# Patient Record
Sex: FEMALE | Race: WHITE | NOT HISPANIC OR LATINO | Employment: FULL TIME | URBAN - METROPOLITAN AREA
[De-identification: names, ages, dates, MRNs, and addresses within clinical notes are randomized per-mention and may not be internally consistent; named-entity substitution may affect disease eponyms.]

---

## 2023-08-22 ENCOUNTER — OCCUPATIONAL MEDICINE (OUTPATIENT)
Dept: URGENT CARE | Facility: PHYSICIAN GROUP | Age: 28
End: 2023-08-22
Payer: OTHER MISCELLANEOUS

## 2023-08-22 VITALS
OXYGEN SATURATION: 98 % | RESPIRATION RATE: 18 BRPM | BODY MASS INDEX: 27.78 KG/M2 | TEMPERATURE: 97.6 F | HEART RATE: 65 BPM | DIASTOLIC BLOOD PRESSURE: 64 MMHG | SYSTOLIC BLOOD PRESSURE: 116 MMHG | WEIGHT: 177 LBS | HEIGHT: 67 IN

## 2023-08-22 DIAGNOSIS — S39.012A LUMBAR STRAIN, INITIAL ENCOUNTER: ICD-10-CM
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
## 2023-08-22 PROCEDURE — 3078F DIAST BP <80 MM HG: CPT | Performed by: PHYSICIAN ASSISTANT

## 2023-08-22 PROCEDURE — 3074F SYST BP LT 130 MM HG: CPT | Performed by: PHYSICIAN ASSISTANT

## 2023-08-22 PROCEDURE — 99204 OFFICE O/P NEW MOD 45 MIN: CPT | Performed by: PHYSICIAN ASSISTANT

## 2023-08-22 RX ORDER — NORETHINDRONE ACETATE AND ETHINYL ESTRADIOL, ETHINYL ESTRADIOL AND FERROUS FUMARATE 1MG-10(24)
1 KIT ORAL DAILY
COMMUNITY
Start: 2023-08-03

## 2023-08-22 RX ORDER — VITAMIN B COMPLEX
2000 TABLET ORAL DAILY
COMMUNITY

## 2023-08-22 RX ORDER — SERTRALINE HYDROCHLORIDE 100 MG/1
TABLET, FILM COATED ORAL
COMMUNITY
Start: 2023-08-10

## 2023-08-22 NOTE — LETTER
"EMPLOYEE’S CLAIM FOR COMPENSATION/ REPORT OF INITIAL TREATMENT  FORM C-4  PLEASE TYPE OR PRINT    EMPLOYEE’S CLAIM - PROVIDE ALL INFORMATION REQUESTED   First Name  Meseret Last Name  Ericka Birthdate                    1995                Sex  female Claim Number (Insurer’s Use Only)   Home Address  [address] Age  27 y.o. Height  1.702 m (5' 7\") Weight  80.3 kg (177 lb) [facility]     [facility] Zip  [address] Telephone  [phone] (home)    Mailing Address  [address]  [address] Primary Language Spoken  English    INSURER   THIRD-PARTY     [facility] Insurance   Employee's Occupation (Job Title) When Injury or Occupational Disease Occurred  [employer]     Employer's Name/Company Name     Telephone      Office Mail Address (Number and Street)          Date of Injury  8/21/2023               Hours Injury  5:15 AM Date Employer Notified  8/21/2023 Last Day of Work after Injury or Occupational Disease  8/22/2023 Supervisor to Whom Injury     Reported  Kayla Monge/ Betty Cisneros   Address or Location of Accident (if applicable)  Work [1]   What were you doing at the time of accident? (if applicable)  Walking to the kitchen door.    How did this injury or occupational disease occur? (Be specific and answer in detail. Use additional sheet if necessary)  I was walking to the kitchen door, while our core angeline  was apparently moving toward the door at the same time. He was moving very fast and quickly opened the door with a loaded hand cart. The door hit me with such force, that I was pushed backward, hitting my back on the floor.   If you believe that you have an occupational disease, when did you first have knowledge of the disability and its relationship to your employment?   Witnesses to the Accident (if applicable)  Teddy Hernandez Lorri " Maximo Stark      Nature of Injury or Occupational Disease  Workers' Compensation  Part(s) of Body Injured or Affected  Lower Back Area (Lumbar Area & Lumbo-Sacral), N/A, N/A    I CERTIFY THAT THE ABOVE IS TRUE AND CORRECT TO T HE BEST OF MY KNOWLEDGE AND THAT I HAVE PROVIDED THIS INFORMATION IN ORDER TO OBTAIN THE BENEFITS OF [place], OR [place]).  I HEREBY AUTHORIZE ANY PHYSICIAN, CHIROPRACTOR, SURGEON, PRACTITIONER OR ANY OTHER PERSON, ANY HOSPITAL, INCLUDING [facility] OR [facility], ANY  MEDICAL SERVICE ORGANIZATION, ANY INSURANCE COMPANY, OR OTHER INSTITUTION OR ORGANIZATION TO RELEASE TO EACH OTHER, ANY MEDICAL OR OTHER INFORMATION, INCLUDING BENEFITS PAID OR PAYABLE, PERTINENT TO THIS INJURY OR DISEASE, EXCEPT INFORMATION RELATIVE TO DIAGNOSIS, TREATMENT AND/OR COUNSELING FOR AIDS, PSYCHOLOGICAL CONDITIONS, ALCOHOL OR CONTROLLED SUBSTANCES, FOR WHICH I MUST GIVE SPECIFIC AUTHORIZATION.  A PHOTOSTAT OF THIS AUTHORIZATION SHALL BE VALID AS THE ORIGINAL.       Date   Place Employee’s Original or  *Electronic Signature   THIS REPORT MUST BE COMPLETED AND MAILED WITHIN 3 WORKING DAYS OF TREATMENT   Place  [facility]  Name of Facility  [facility]   Date  8/22/2023 Diagnosis and Description of Injury or Occupational Disease  (S39.012A) Lumbar strain, initial encounter Is there evidence that the injured employee was under the influence of alcohol and/or another controlled substance at the time of accident?  ? No ? Yes (if yes, please explain)   Hour  4:03 PM   The encounter diagnosis was Lumbar strain, initial encounter. No   Treatment  May continue OTC NSAIDs.  Recommend ice for the next 48 hours and thereafter transition to damp heat.  Patient declines a muscle relaxant.  We will see her back next Monday for reevaluation.  Have you advised the patient to remain off work five days or      "more?    X-Ray Findings      ? Yes Indicate dates:   From   To      From information given by the employee, together with medical evidence, can        you directly connect this injury or occupational disease as job incurred?  Yes ? No If no, is the injured employee capable of:  ? full duty  No ? modified duty  Yes   Is additional medical care by a physician indicated?  Yes If modified duty, specify any limitations / restrictions  10# weight restriction. Seated/sedentary work only   Do you know of any previous injury or disease contributing to this condition or occupational disease?  ? Yes ? No (Explain if yes)                          No   Date  8/22/2023 Print Health Care Provider's  Name  James Peace P.A.-C. I certify that the employer’s copy of  this form was delivered to the employer on:   Address  [address] Insurer’s Use Only     [facility]  [address]    Provider’s Tax ID Number  [id number] Telephone  Dept: [phone]             Health Care Provider’s Original or Electronic Signature  e-JAMES Alvarez P.A.-C. Degree (MD,DO, DC,PAMaycoC,APRN)  PA-C      * Complete and attach Release of Information (Form C-4A) when injured employee signs C-4 Form electronically  ORIGINAL - TREATING HEALTHCARE PROVIDER PAGE 2 - INSURER/TPA PAGE 3 - EMPLOYER PAGE 4 - EMPLOYEE             Form C-4 (rev.08/21)           BRIEF DESCRIPTION OF RIGHTS AND BENEFITS  (Pursuant to [place] 616[place].050)    Notice of Injury or Occupational Disease (Incident Report Form C-1): If an injury or occupational disease (OD) arises out of and in the course of employment, you must provide written notice to your employer as soon as practicable, but no later than 7 days after the accident or OD. Your employer shall maintain a sufficient supply of the required forms.    Claim for Compensation (Form C-4): If medical treatment is sought, the form C-4 is available at the place of initial treatment. A completed \"Claim for Compensation\" (Form C-4) " must be filed within 90 days after an accident or OD. The treating physician or chiropractor must, within 3 working days after treatment, complete and mail to the employer, the employer's insurer and third-party , the Claim for Compensation.    Medical Treatment: If you require medical treatment for your on-the-job injury or OD, you may be required to select a physician or chiropractor from a list provided by your workers’ compensation insurer, if it has contracted with an Organization for Managed Care (MCO) or Preferred Provider Organization (PPO) or providers of health care. If your employer has not entered into a contract with an MCO or PPO, you may select a physician or chiropractor from the Panel of Physicians and Chiropractors. Any medical costs related to your industrial injury or OD will be paid by your insurer.    Temporary Total Disability (TTD): If your doctor has certified that you are unable to work for a period of at least 5 consecutive days, or 5 cumulative days in a 20-day period, or places restrictions on you that your employer does not accommodate, you may be entitled to TTD compensation.    Temporary Partial Disability (TPD): If the wage you receive upon reemployment is less than the compensation for TTD to which you are entitled, the insurer may be required to pay you TPD compensation to make up the difference. TPD can only be paid for a maximum of 24 months.    Permanent Partial Disability (PPD): When your medical condition is stable and there is an indication of a PPD as a result of your injury or OD, within 30 days, your insurer must arrange for an evaluation by a rating physician or chiropractor to determine the degree of your PPD. The amount of your PPD award depends on the date of injury, the results of the PPD evaluation, your age and wage.    Permanent Total Disability (PTD): If you are medically certified by a treating physician or chiropractor as permanently and totally  disabled and have been granted a PTD status by your insurer, you are entitled to receive monthly benefits not to exceed 66 2/3% of your average monthly wage. The amount of your PTD payments is subject to reduction if you previously received a lump-sum PPD award.    Vocational Rehabilitation Services: You may be eligible for vocational rehabilitation services if you are unable to return to the job due to a permanent physical impairment or permanent restrictions as a result of your injury or occupational disease.    Transportation and Per Murphy Reimbursement: You may be eligible for travel expenses and per murphy associated with medical treatment.    Reopening: You may be able to reopen your claim if your condition worsens after claim closure.     Appeal Process: If you disagree with a written determination issued by the insurer or the insurer does not respond to your request, you may appeal to the Department of Administration, , by following the instructions contained in your determination letter. You must appeal the determination within 70 days from the date of the determination letter at [address], [address], [place] [address], or [address], [address], [place] [address]. If you disagree with the  decision, you may appeal to the Department of Administration, . You must file your appeal within 30 days from the date of the  decision letter at [address], [address], [place], [place] [address], or [address], [address], [place] [address]. If you disagree with a decision of an , you may file a petition for judicial review with the District Court. You must do so within 30 days of the Appeal Officer’s decision. You may be represented by an  at your own expense or you may contact the [facility] for possible representation.    [place]  for Injured Workers (IW): If you disagree  with a  decision, you may request that [facility] represent you without charge at an  Hearing. For information regarding denial of benefits, you may contact the [facility] at: [address], [address], [place] [address], [phone], or [address], [address], [place] [address], [phone]    To File a Complaint with the Division: If you wish to file a complaint with the  of the Division of Industrial Relations (DIR),  please contact the Workers’ Compensation Section, [address], [address], [place], [place] [address], telephone [phone]) [phone], or [address], [address], [place], [place] [address], telephone [phone].    For assistance with Workers’ Compensation Issues: You may contact the [facility] Office for Consumer Health Assistance, [address], [address], [place], [place] [address], Toll Free 1-496.847.5914, Web site: http://[facility].[place].gov/Programs/VAIBHAV E-mail: [email or web address]              __________________________________________________________________                                    _________________            Employee Name / Signature                                                                                                                            Date                                                                                                                                                                                                                              D-2 (rev. 10/20)

## 2023-08-22 NOTE — PROGRESS NOTES
"Subjective:   Meseret Barnett is a 27 y.o. female who presents for Back Pain (Workers comp new back pain. Kitchen door opened and hit her she fell to floor. )           Date of Injury:  8/21/2023  Mechanism of Injury:  \"I was walking to the kitchen door, while our core angeline  was apparently moving toward the door at the same time. He was moving very fast and quickly opened the door with a loaded hand cart. The door hit me with such force, that I was pushed backward, hitting my back on the floor.\"    Pain is located in the left low back and left buttock.  Pain does not radiate.  No lower extremity numbness or tingling, no saddle dysesthesias, no loss of bowel or bladder control, no lower extremity weakness.  Ice, ibuprofen helped a bit.      ROS    PMH: Past medical history reviewed in Epic  MEDS: Medications were reviewed in Epic  ALLERGIES: Allergies were reviewed in Epic     Objective:   /64 (BP Location: Right arm, Patient Position: Sitting, BP Cuff Size: Adult)   Pulse 65   Temp 36.4 °C (97.6 °F) (Temporal)   Resp 18   Ht 1.702 m (5' 7\")   Wt 80.3 kg (177 lb)   SpO2 98%   BMI 27.72 kg/m²   Physical Exam  Vitals reviewed.   Constitutional:       General: She is not in acute distress.     Appearance: Normal appearance. She is well-developed. She is not toxic-appearing.   HENT:      Head: Normocephalic and atraumatic.      Right Ear: External ear normal.      Left Ear: External ear normal.      Nose: Nose normal.   Cardiovascular:      Rate and Rhythm: Normal rate and regular rhythm.   Pulmonary:      Effort: Pulmonary effort is normal. No respiratory distress.      Breath sounds: No stridor.   Musculoskeletal:      Comments: Back:  General: No asymmetry, bruising, or erythema appreciated  ROM: restricted  Palpation: No tenderness to palpation of spinous processes, no step-offs appreciated. Sacrum NTTP. TTP left paraspinals, left quadratus laborum and left glute med.   Strength: 5/5 " resisted hip, knee, ankle flexion/extension  Special tests: Straight leg raise -   Neuro: Sensation intact and equal bilaterally in LE's     Skin:     General: Skin is dry.   Neurological:      Comments: Alert and oriented.    Psychiatric:         Speech: Speech normal.         Behavior: Behavior normal.          Assessment/Plan:   1. Lumbar strain, initial encounter    2. Fall, initial encounter    3. Work related injury    Other orders  - sertraline (ZOLOFT) 100 MG Tab; TAKE 1/2 TABLET BY MOUTH ONCE DAILY THEN 1 ONCE DAILY FOR DEPRESSION AND FOR ANXIETY IF REGRESSING PREFERABLE AT BEDTIME  - LO LOESTRIN FE 1 MG-10 MCG / 10 MCG Tab; Take 1 Tablet by mouth every day.  - vitamin D3 (CHOLECALCIFEROL) 1000 Unit (25 mcg) Tab; Take 2,000 Units by mouth every day.    No bony tenderness on exam. Discussed obtaining imaging, but clinical suspicion for fx low at this time. Through shared decision making we decided to forgo imaging today. Will reconsider if sx are not improving.     May continue OTC NSAIDs.  Recommend ice for the next 48 hours and thereafter transition to damp heat.  Patient declines a muscle relaxant.  We will see her back next Monday for reevaluation.

## 2023-08-28 ENCOUNTER — OCCUPATIONAL MEDICINE (OUTPATIENT)
Dept: URGENT CARE | Facility: PHYSICIAN GROUP | Age: 28
End: 2023-08-28
Payer: OTHER MISCELLANEOUS

## 2023-08-28 VITALS
SYSTOLIC BLOOD PRESSURE: 122 MMHG | RESPIRATION RATE: 14 BRPM | HEART RATE: 68 BPM | HEIGHT: 67 IN | WEIGHT: 177 LBS | TEMPERATURE: 97.1 F | OXYGEN SATURATION: 100 % | DIASTOLIC BLOOD PRESSURE: 66 MMHG | BODY MASS INDEX: 27.78 KG/M2

## 2023-08-28 DIAGNOSIS — S39.012D LOW BACK STRAIN, SUBSEQUENT ENCOUNTER: ICD-10-CM
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
## 2023-08-28 PROCEDURE — 3074F SYST BP LT 130 MM HG: CPT

## 2023-08-28 PROCEDURE — 99213 OFFICE O/P EST LOW 20 MIN: CPT

## 2023-08-28 PROCEDURE — 3078F DIAST BP <80 MM HG: CPT

## 2023-08-28 NOTE — PROGRESS NOTES
"Subjective:     Meseret Barnett is a 27 y.o. female who presents for Other (WC F/U: Lower back injury, is improving. )      DOI:  8/21/2023  \"I was walking to the kitchen door, while our core angeline  was apparently moving toward the door at the same time. He was moving very fast and quickly opened the door with a loaded hand cart. The door hit me with such force, that I was pushed backward, hitting my back on the floor.\"    Pain is located in the left low back and left buttock.  Pain does not radiate.  No lower extremity numbness or tingling, no saddle dysesthesias, no loss of bowel or bladder control, no lower extremity weakness.  Ice, ibuprofen helped a bit. May continue OTC NSAIDs.  Recommend ice for the next 48 hours and thereafter transition to damp heat.  Patient declines a muscle relaxant    8/28 2nd visit  reports she is generally improving, she still has mild pain when she goes from sit to standing or if she is bending over trying  to pick something off floor, otherwise pain free.  She has been stretching, using ibuprofen and going on walks. Feels like she may be able to increase her weight restrictions.     PMH:   No pertinent past medical history to this problem  MEDS:  Medications were reviewed in EMR  ALLERGIES:  Allergies were reviewed in EMR  FH:   No pertinent family history to this problem       Objective:     /66   Pulse 68   Temp 36.2 °C (97.1 °F)   Resp 14   Ht 1.702 m (5' 7\")   Wt 80.3 kg (177 lb)   SpO2 100%   BMI 27.72 kg/m²     Gen: no acute distress, normal voice  Skin: dry, intact, moist mucosal membranes  Head: Atraumatic, normocephalic  Psych: normal affect, normal judgement, alert, awake  Musculoskeletal: No TTp to paraspinous muscles, 5/5 strength        Assessment/Plan:       There are no diagnoses linked to this encounter.  Released to Restricted Duty FROM 8/28/2023 TO 9/4/2023  No pushing or pulling, limit bending as tolerated.  Weight limit of no more " than 20 lbs.  Continue heat therapy and ibuprofen.  OK to swim for exercise.  Will see back in UC in 1 week for potential DC       Differential diagnosis, natural history, supportive care, and indications for immediate follow-up discussed.

## 2023-08-28 NOTE — LETTER
"   [facility]  [address]  [place] [address]  Phone:  [phone] - Fax:  [phone]   [facility] Progress Report and Disability Certification  Date of Service: 8/28/2023   No Show:  No  Date / Time of Next Visit: 9/4/2023   Claim Information   Patient Name: Meseret Barnett  Claim Number:     Employer:    Date of Injury: 8/21/2023     Insurer / TPA: [facility]  ID / SSN:     Occupation: [employer]   Diagnosis: There were no encounter diagnoses.    Medical Information   Related to Industrial Injury? Yes    Subjective Complaints:  DOI:  8/21/2023  \"I was walking to the kitchen door, while our core angeline  was apparently moving toward the door at the same time. He was moving very fast and quickly opened the door with a loaded hand cart. The door hit me with such force, that I was pushed backward, hitting my back on the floor.\"    Pain is located in the left low back and left buttock.  Pain does not radiate.  No lower extremity numbness or tingling, no saddle dysesthesias, no loss of bowel or bladder control, no lower extremity weakness.  Ice, ibuprofen helped a bit. May continue OTC NSAIDs.  Recommend ice for the next 48 hours and thereafter transition to damp heat.  Patient declines a muscle relaxant    8/28 2nd visit  reports she is generally improving, she still has mild pain when she goes from sit to standing or if she is bending over trying  to pick something off floor, otherwise pain free.  She has been stretching, using ibuprofen and going on walks. Feels like she may be able to increase her weight restrictions.    Objective Findings: Gen: no acute distress, normal voice  Skin: dry, intact, moist mucosal membranes  Head: Atraumatic, normocephalic  Psych: normal affect, normal judgement, alert, awake  Musculoskeletal: No TTp to paraspinous muscles, 5/5 strength       Pre-Existing Condition(s):     Assessment:   Condition Improved  "   Status: Additional Care Required  Permanent Disability:No    Plan:      Diagnostics:      Comments:       Disability Information   Status: Released to Restricted Duty    From:  2023  Through: 2023 Restrictions are: Temporary   Physical Restrictions   Sitting:    Standing:    Stooping:    Bendin hrs/day   Squatting:    Walking:    Climbing:    Pushin hrs/day   Pullin hrs/day Other:    Reaching Above Shoulder (L):   Reaching Above Shoulder (R):       Reaching Below Shoulder (L):    Reaching Below Shoulder (R):      Not to exceed Weight Limits   Carrying(hrs):   Weight Limit(lb):   Lifting(hrs):   Weight  Limit(lb):     Comments: No pushing or pulling, limit bending as tolerated.  Weight limit of no more than 20 lbs.  Continue heat therapy and ibuprofen.  OK to swim for exercise.  Will see back in UC in 1 week for potential DC    Repetitive Actions   Hands: i.e. Fine Manipulations from Grasping:     Feet: i.e. Operating Foot Controls:     Driving / Operate Machinery:     Health Care Provider’s Original or Electronic Signature  MADAI Dey Health Care Provider’s Original or Electronic Signature    Harrison Narayanan DO MPH     Clinic Name / Location: [facility] [place] [facility] [address] [address] Clinic Phone Number: Dept: [phone]   Appointment Time: 3:00 Pm Visit Start Time: 3:51 PM   Check-In Time:  3:06 Pm Visit Discharge Time:  4:30pm   Original-Treating Physician or Chiropractor    Page 2-Insurer/TPA    Page 3-Employer    Page 4-Employee

## 2023-09-04 ENCOUNTER — HOSPITAL ENCOUNTER (OUTPATIENT)
Dept: RADIOLOGY | Facility: MEDICAL CENTER | Age: 28
End: 2023-09-04
Attending: NURSE PRACTITIONER
Payer: OTHER MISCELLANEOUS

## 2023-09-04 ENCOUNTER — OCCUPATIONAL MEDICINE (OUTPATIENT)
Dept: URGENT CARE | Facility: PHYSICIAN GROUP | Age: 28
End: 2023-09-04
Payer: OTHER MISCELLANEOUS

## 2023-09-04 VITALS
SYSTOLIC BLOOD PRESSURE: 120 MMHG | DIASTOLIC BLOOD PRESSURE: 60 MMHG | RESPIRATION RATE: 18 BRPM | BODY MASS INDEX: 28.09 KG/M2 | WEIGHT: 179 LBS | HEART RATE: 74 BPM | TEMPERATURE: 98 F | HEIGHT: 67 IN | OXYGEN SATURATION: 98 %

## 2023-09-04 DIAGNOSIS — S39.012D LOW BACK STRAIN, SUBSEQUENT ENCOUNTER: ICD-10-CM

## 2023-09-04 PROCEDURE — 99213 OFFICE O/P EST LOW 20 MIN: CPT | Performed by: NURSE PRACTITIONER

## 2023-09-04 PROCEDURE — 3078F DIAST BP <80 MM HG: CPT | Performed by: NURSE PRACTITIONER

## 2023-09-04 PROCEDURE — 3074F SYST BP LT 130 MM HG: CPT | Performed by: NURSE PRACTITIONER

## 2023-09-04 PROCEDURE — 72100 X-RAY EXAM L-S SPINE 2/3 VWS: CPT

## 2023-09-04 ASSESSMENT — ENCOUNTER SYMPTOMS
FOCAL WEAKNESS: 0
TINGLING: 0
BACK PAIN: 1
CHILLS: 0
SENSORY CHANGE: 0
FEVER: 0

## 2023-09-04 NOTE — LETTER
[facility]  [address]  [place] [address]  Phone:  [phone] - Fax:  [phone]   [facility] Progress Report and Disability Certification  Date of Service: 9/4/2023   No Show:  No  Date / Time of Next Visit: 9/11/2023   Claim Information   Patient Name: Meseret Barnett  Claim Number:     Employer:    Date of Injury: 8/21/2023     Insurer / TPA: [facility] Insurance  ID / SSN:     Occupation: [employer]   Diagnosis: The encounter diagnosis was Low back strain, subsequent encounter.    Medical Information   Related to Industrial Injury? Yes    Subjective Complaints:  DOI: 8/22/23. Patient is 28 year old female with low back injury after a fall on her back at work. This is third follow up. She does report improvement. Denies distal paresthesia, focal weakness, pelvic paresthesia. Concerned about possible fracture due to mechanism of injury. Using heat for relief of symptoms. Tolerating restrictions.   Objective Findings: Physical Exam  Constitutional:       Appearance: Normal appearance.   Cardiovascular:      Rate and Rhythm: Normal rate and regular rhythm.      Heart sounds: No murmur heard.  Pulmonary:      Effort: Pulmonary effort is normal.      Breath sounds: Normal breath sounds.   Musculoskeletal:      Lumbar back: Spasms and tenderness present. No swelling or bony tenderness. Normal range of motion.        Back:     Skin:     General: Skin is warm and dry.   Neurological:      General: No focal deficit present.      Mental Status: She is alert and oriented to person, place, and time.   Psychiatric:         Mood and Affect: Mood normal.        Pre-Existing Condition(s):     Assessment:   Condition Improved    Status: Additional Care Required  Permanent Disability:No    Plan: Medication    Diagnostics: X-ray    Comments:       Disability Information   Status: Released to Restricted Duty    From:  9/4/2023  Through: 9/11/2023 Restrictions are:  Temporary   Physical Restrictions   Sitting:    Standing:    Stooping:    Bending:      Squatting:    Walking:    Climbing:    Pushing:      Pulling:    Other:    Reaching Above Shoulder (L):   Reaching Above Shoulder (R):       Reaching Below Shoulder (L):    Reaching Below Shoulder (R):      Not to exceed Weight Limits   Carrying(hrs):   Weight Limit(lb):   Lifting(hrs):   Weight  Limit(lb):     Comments: No change for previous restrictions.    Repetitive Actions   Hands: i.e. Fine Manipulations from Grasping:     Feet: i.e. Operating Foot Controls:     Driving / Operate Machinery:     Health Care Provider’s Original or Electronic Signature  RONY Garcia Health Care Provider’s Original or Electronic Signature    Harrison Narayanan DO MPH     Clinic Name / Location: [address] [address] Clinic Phone Number: Dept: [phone]   Appointment Time: 3:45 Pm Visit Start Time: 4:01 PM   Check-In Time:  3:45 Pm Visit Discharge Time:  5:00 PM    Original-Treating Physician or Chiropractor    Page 2-Insurer/TPA    Page 3-Employer    Page 4-Employee

## 2023-09-11 ENCOUNTER — OCCUPATIONAL MEDICINE (OUTPATIENT)
Dept: URGENT CARE | Facility: PHYSICIAN GROUP | Age: 28
End: 2023-09-11
Payer: OTHER MISCELLANEOUS

## 2023-09-11 VITALS
HEART RATE: 73 BPM | RESPIRATION RATE: 12 BRPM | SYSTOLIC BLOOD PRESSURE: 114 MMHG | BODY MASS INDEX: 28.09 KG/M2 | OXYGEN SATURATION: 99 % | WEIGHT: 179 LBS | TEMPERATURE: 98 F | DIASTOLIC BLOOD PRESSURE: 70 MMHG | HEIGHT: 67 IN

## 2023-09-11 DIAGNOSIS — S39.012D STRAIN OF LUMBAR REGION, SUBSEQUENT ENCOUNTER: ICD-10-CM

## 2023-09-11 PROCEDURE — 99213 OFFICE O/P EST LOW 20 MIN: CPT | Performed by: NURSE PRACTITIONER

## 2023-09-11 PROCEDURE — 3074F SYST BP LT 130 MM HG: CPT | Performed by: NURSE PRACTITIONER

## 2023-09-11 PROCEDURE — 3078F DIAST BP <80 MM HG: CPT | Performed by: NURSE PRACTITIONER

## 2023-09-11 NOTE — LETTER
[facility]  [address]  [place] [address]  Phone:  [phone] - Fax:  [phone]   [facility] Progress Report and Disability Certification  Date of Service: 9/11/2023   No Show:  No  Date / Time of Next Visit: 9/18/2023   Claim Information   Patient Name: Meseret Barnett  Claim Number:     Employer:    Date of Injury: 8/21/2023     Insurer / TPA: [facility] [employer]  ID / SSN:     Occupation: [employer]   Diagnosis: There were no encounter diagnoses.    Medical Information   Related to Industrial Injury? Yes    Subjective Complaints:  (Copied from prior visit for continuity of care)  HPI/DOI: 8/22/23. Patient is 28 year old female with low back injury after a fall on her back at work. This is third follow up. She does report improvement. Denies distal paresthesia, focal weakness, pelvic paresthesia. Concerned about possible fracture due to mechanism of injury. Using heat for relief of symptoms. Tolerating restrictions.    F/V#4 9/11/2023:  Patient reports improvement in her overall symptoms.  Pain is midline low back, bilaterally, left greater than right, without radiation.  She states that she is approximately 75% improved now. She states that she is doing home stretching exercises which is helping.  She is also taking ibuprofen 600 mg daily for pain which is helpful.  She does feel ready to increase her work restrictions now.    Objective Findings: Low back:  Mild tenderness to palpation.  Full ROM, mild pain on the end of extension.  No spasm palpable.  No deformity.  Neurovascular status is intact.    Pre-Existing Condition(s):     Assessment:   Condition Improved    Status: Additional Care Required  Permanent Disability:No    Plan:      Diagnostics:      Comments:       Disability Information   Status: Released to Restricted Duty    From:  9/11/2023  Through: 9/18/2023 Restrictions are:     Physical Restrictions   Sitting:    Standing:     Stooping:  < or = to 2 hrs/day Bending:      Squatting:    Walking:    Climbing:    Pushing:  < or = to 4 hrs/day   Pulling:  < or = to 4 hrs/day Other:    Reaching Above Shoulder (L):   Reaching Above Shoulder (R):       Reaching Below Shoulder (L):    Reaching Below Shoulder (R):      Not to exceed Weight Limits   Carrying(hrs):   Weight Limit(lb):   Lifting(hrs):   Weight  Limit(lb):     Comments: 1. Lumbar strain  Restrictions per D39, increase lifting limit to 30 pounds.  Continue home stretching exercises  Okay to progress to regular exercise as tolerated  Continue ice, heat and antiinflammatories  Return in one week for reevaluation    Repetitive Actions   Hands: i.e. Fine Manipulations from Grasping:     Feet: i.e. Operating Foot Controls:     Driving / Operate Machinery:     Health Care Provider’s Original or Electronic Signature  MADAI Coyne Health Care Provider’s Original or Electronic Signature    Harrison Narayanan DO MPH     Clinic Name / Location: [address] [address] Clinic Phone Number: Dept: [phone]   Appointment Time: 3:00 Pm Visit Start Time: 3:21 PM   Check-In Time:  2:58 Pm Visit Discharge Time:  4:15PM   Original-Treating Physician or Chiropractor    Page 2-Insurer/TPA    Page 3-Employer    Page 4-Employee

## 2023-09-11 NOTE — PROGRESS NOTES
"Subjective:     Meseret Barnett is a 28 y.o. female who presents for Other (W/C F/U: Lower back, feels a lot better, can exercise regularly. )      (Copied from prior visit for continuity of care)  HPI/DOI: 8/22/23. Patient is 28 year old female with low back injury after a fall on her back at work. This is third follow up. She does report improvement. Denies distal paresthesia, focal weakness, pelvic paresthesia. Concerned about possible fracture due to mechanism of injury. Using heat for relief of symptoms. Tolerating restrictions.    F/V#4 9/11/2023:  Patient reports improvement in her overall symptoms.  Pain is midline low back, bilaterally, left greater than right, without radiation.  She states that she is approximately 75% improved now. She states that she is doing home stretching exercises which is helping.  She is also taking ibuprofen 600 mg daily for pain which is helpful.  She does feel ready to increase her work restrictions now.     PMH:   No pertinent past medical history to this problem  MEDS:  Medications were reviewed in EMR  ALLERGIES:  Allergies were reviewed in EMR  SOCHX:  Works as a   FH:   No pertinent family history to this problem       Objective:     /70   Pulse 73   Temp 36.7 °C (98 °F)   Resp 12   Ht 1.702 m (5' 7\")   Wt 81.2 kg (179 lb)   SpO2 99%   BMI 28.04 kg/m²     Low back:  Mild tenderness to palpation.  Full ROM, mild pain on the end of extension.  No spasm palpable.  No deformity.  Neurovascular status is intact.     Assessment/Plan:       1. Strain of lumbar region, subsequent encounter    Released to Restricted Duty FROM 9/11/2023 TO 9/18/2023  1. Lumbar strain  Restrictions per D39, increase lifting limit to 30 pounds.  Continue home stretching exercises  Okay to progress to regular exercise as tolerated  Continue ice, heat and antiinflammatories  Return in one week for reevaluation       Differential diagnosis, natural history, supportive " care, and indications for immediate follow-up discussed.

## 2023-09-19 ENCOUNTER — OCCUPATIONAL MEDICINE (OUTPATIENT)
Dept: URGENT CARE | Facility: PHYSICIAN GROUP | Age: 28
End: 2023-09-19
Payer: OTHER MISCELLANEOUS

## 2023-09-19 VITALS
TEMPERATURE: 97.8 F | HEIGHT: 67 IN | DIASTOLIC BLOOD PRESSURE: 76 MMHG | WEIGHT: 179 LBS | SYSTOLIC BLOOD PRESSURE: 118 MMHG | OXYGEN SATURATION: 96 % | BODY MASS INDEX: 28.09 KG/M2 | HEART RATE: 90 BPM | RESPIRATION RATE: 18 BRPM
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
## 2023-09-19 DIAGNOSIS — S39.012D STRAIN OF LUMBAR REGION, SUBSEQUENT ENCOUNTER: ICD-10-CM

## 2023-09-19 PROCEDURE — 3074F SYST BP LT 130 MM HG: CPT | Performed by: STUDENT IN AN ORGANIZED HEALTH CARE EDUCATION/TRAINING PROGRAM

## 2023-09-19 PROCEDURE — 99213 OFFICE O/P EST LOW 20 MIN: CPT | Performed by: STUDENT IN AN ORGANIZED HEALTH CARE EDUCATION/TRAINING PROGRAM

## 2023-09-19 PROCEDURE — 3078F DIAST BP <80 MM HG: CPT | Performed by: STUDENT IN AN ORGANIZED HEALTH CARE EDUCATION/TRAINING PROGRAM

## 2023-09-19 NOTE — LETTER
[facility]  [address]  [place] [address]  Phone:  [phone] - Fax:  [phone]   [facility] Progress Report and Disability Certification  Date of Service: 9/19/2023   No Show:  No  Date / Time of Next Visit:  Discharged/MMI   Claim Information   Patient Name: Meseret Barnett  Claim Number:     Employer:    Date of Injury: 8/21/2023     Insurer / TPA: [facility] [employer]  ID / SSN:     Occupation: [employer]   Diagnosis: Diagnoses of Strain of lumbar region, subsequent encounter and Work related injury were pertinent to this visit.    Medical Information   Related to Industrial Injury? Yes    Subjective Complaints:  HPI/DOI: 8/22/23. Patient is 28 year old female with low back injury after a fall on her back at work    Today's date: 9/19/2023.  Visit #5.  Patient reports she is back to 100% and is no longer experiencing any pain.  No additional complaints.    Objective Findings: Gen: no acute distress, normal voice  Skin: dry, intact, moist mucosal membranes  Head: Atraumatic, normocephalic  Psych: normal affect, normal judgement, alert, awake  Musculoskeletal: Full range of motion associated with minimal discernible discomfort with terminal flexion.  No focal midline TTP or tenderness along paraspinal soft tissue.  No motor or sensory deficits.     Pre-Existing Condition(s):     Assessment:   Condition Improved    Status: Discharged /  MMI  Permanent Disability:No    Plan:      Diagnostics:      Comments:       Disability Information   Status: Released to Full Duty    From:     Through:   Restrictions are:     Physical Restrictions   Sitting:    Standing:    Stooping:    Bending:      Squatting:    Walking:    Climbing:    Pushing:      Pulling:    Other:    Reaching Above Shoulder (L):   Reaching Above Shoulder (R):       Reaching Below Shoulder (L):    Reaching Below Shoulder (R):      Not to exceed Weight Limits   Carrying(hrs):   Weight  Limit(lb):   Lifting(hrs):   Weight  Limit(lb):     Comments: Discharged/MMI    Repetitive Actions   Hands: i.e. Fine Manipulations from Grasping:     Feet: i.e. Operating Foot Controls:     Driving / Operate Machinery:     Health Care Provider’s Original or Electronic Signature  Yared Alonzo D.O. Health Care Provider’s Original or Electronic Signature    Harrison Narayanan DO MPH     Clinic Name / Location: [address] [address] Clinic Phone Number: Dept: [phone]   Appointment Time: 3:15 Pm Visit Start Time: 3:21 PM   Check-In Time:  3:15 Pm Visit Discharge Time:  4:02PM   Original-Treating Physician or Chiropractor    Page 2-Insurer/TPA    Page 3-Employer    Page 4-Employee

## 2023-09-19 NOTE — PROGRESS NOTES
"Subjective:     Meseret Barnett is a 28 y.o. female who presents for Back Pain ([facility] Fv lower back pain)      HPI/DOI: 8/22/23. Patient is 28 year old female with low back injury after a fall on her back at work    Today's date: 9/19/2023.  Visit #5.  Patient reports she is back to 100% and is no longer experiencing any pain.  No additional complaints.     PMH:   No pertinent past medical history to this problem  MEDS:  Medications were reviewed in EMR  ALLERGIES:  Allergies were reviewed in EMR  FH:   No pertinent family history to this problem       Objective:     /76   Pulse 90   Temp 36.6 °C (97.8 °F)   Resp 18   Ht 1.702 m (5' 7\")   Wt 81.2 kg (179 lb)   SpO2 96%   BMI 28.04 kg/m²     Gen: no acute distress, normal voice  Skin: dry, intact, moist mucosal membranes  Head: Atraumatic, normocephalic  Psych: normal affect, normal judgement, alert, awake  Musculoskeletal: Full range of motion associated with minimal discernible discomfort with terminal flexion.  No focal midline TTP or tenderness along paraspinal soft tissue.  No motor or sensory deficits.      Assessment/Plan:       1. Strain of lumbar region, subsequent encounter    2. Work related injury    Released to Full Duty FROM   TO    Discharged/MMI       Differential diagnosis, natural history, supportive care, and indications for immediate follow-up discussed.    "

## 2023-10-03 ENCOUNTER — OFFICE VISIT (OUTPATIENT)
Dept: URGENT CARE | Facility: PHYSICIAN GROUP | Age: 28
End: 2023-10-03
Payer: COMMERCIAL

## 2023-10-03 ENCOUNTER — APPOINTMENT (OUTPATIENT)
Dept: URGENT CARE | Facility: PHYSICIAN GROUP | Age: 28
End: 2023-10-03

## 2023-10-03 ENCOUNTER — HOSPITAL ENCOUNTER (OUTPATIENT)
Facility: MEDICAL CENTER | Age: 28
End: 2023-10-03
Attending: NURSE PRACTITIONER
Payer: COMMERCIAL

## 2023-10-03 VITALS
HEIGHT: 67 IN | SYSTOLIC BLOOD PRESSURE: 116 MMHG | RESPIRATION RATE: 16 BRPM | OXYGEN SATURATION: 100 % | TEMPERATURE: 97.3 F | DIASTOLIC BLOOD PRESSURE: 64 MMHG | HEART RATE: 70 BPM | BODY MASS INDEX: 27.47 KG/M2 | WEIGHT: 175 LBS

## 2023-10-03 DIAGNOSIS — J02.9 PHARYNGITIS, UNSPECIFIED ETIOLOGY: ICD-10-CM

## 2023-10-03 DIAGNOSIS — R68.89 FLU-LIKE SYMPTOMS: ICD-10-CM

## 2023-10-03 LAB
FLUAV RNA SPEC QL NAA+PROBE: NEGATIVE
FLUBV RNA SPEC QL NAA+PROBE: NEGATIVE
RSV RNA SPEC QL NAA+PROBE: NEGATIVE
S PYO DNA SPEC NAA+PROBE: NOT DETECTED
SARS-COV-2 RNA RESP QL NAA+PROBE: NEGATIVE

## 2023-10-03 PROCEDURE — 87070 CULTURE OTHR SPECIMN AEROBIC: CPT

## 2023-10-03 PROCEDURE — 0241U POCT CEPHEID COV-2, FLU A/B, RSV - PCR: CPT | Performed by: NURSE PRACTITIONER

## 2023-10-03 PROCEDURE — 87077 CULTURE AEROBIC IDENTIFY: CPT

## 2023-10-03 PROCEDURE — 3074F SYST BP LT 130 MM HG: CPT | Performed by: NURSE PRACTITIONER

## 2023-10-03 PROCEDURE — 99213 OFFICE O/P EST LOW 20 MIN: CPT | Performed by: NURSE PRACTITIONER

## 2023-10-03 PROCEDURE — 3078F DIAST BP <80 MM HG: CPT | Performed by: NURSE PRACTITIONER

## 2023-10-03 PROCEDURE — 87651 STREP A DNA AMP PROBE: CPT | Performed by: NURSE PRACTITIONER

## 2023-10-03 RX ORDER — DEXAMETHASONE SODIUM PHOSPHATE 10 MG/ML
10 INJECTION INTRAMUSCULAR; INTRAVENOUS ONCE
Status: COMPLETED | OUTPATIENT
Start: 2023-10-03 | End: 2023-10-03

## 2023-10-03 RX ADMIN — DEXAMETHASONE SODIUM PHOSPHATE 10 MG: 10 INJECTION INTRAMUSCULAR; INTRAVENOUS at 15:02

## 2023-10-03 ASSESSMENT — ENCOUNTER SYMPTOMS
SWOLLEN GLANDS: 1
TROUBLE SWALLOWING: 1
SORE THROAT: 1
COUGH: 0
CHILLS: 1
HEADACHES: 1
DIARRHEA: 0
FEVER: 1
VOMITING: 0

## 2023-10-03 NOTE — LETTER
October 3, 2023    To Whom It May Concern:         This is confirmation that Meseret Barnett attended her scheduled appointment with MADAI Parnell on 10/03/23. Please excuse her absence due to an acute illness. She may return to work on 10/6/2023 or sooner if better.          If you have any questions please do not hesitate to call me at the phone number listed below.    Sincerely,          ROJELIO Parnell.  [phone]

## 2023-10-03 NOTE — PROGRESS NOTES
"Subjective:     Meseret Barnett is a 28 y.o. female who presents for Sore Throat (X1 day: Painful to swallow, inflamed tonsils, headache, chills. )      Pharyngitis   This is a new problem. Episode onset: Meseret is a pleasant 28 year old female who presents to  today with complaints of a severe sore throat and headache that started yesterday. The problem has been gradually worsening. The pain is worse on the right side. Maximum temperature: subjective. Associated symptoms include headaches, swollen glands and trouble swallowing. Pertinent negatives include no congestion, coughing, diarrhea or vomiting. She has tried NSAIDs for the symptoms. The treatment provided mild relief.         Review of Systems   Constitutional:  Positive for chills, fever and malaise/fatigue.   HENT:  Positive for sore throat and trouble swallowing. Negative for congestion.    Respiratory:  Negative for cough.    Gastrointestinal:  Negative for diarrhea and vomiting.   Neurological:  Positive for headaches.       PMH: No past medical history on file.  ALLERGIES:   Allergies   Allergen Reactions    Codeine Nausea     SURGHX: No past surgical history on file.  SOCHX:   Social History     Socioeconomic History    Marital status: Single   Tobacco Use    Smoking status: Never    Smokeless tobacco: Never   Vaping Use    Vaping Use: Never used   Substance and Sexual Activity    Alcohol use: Never    Drug use: Never     FH: No family history on file.      Objective:   /64   Pulse 70   Temp 36.3 °C (97.3 °F)   Resp 16   Ht 1.702 m (5' 7\")   Wt 79.4 kg (175 lb)   SpO2 100%   BMI 27.41 kg/m²     Physical Exam  Vitals and nursing note reviewed.   Constitutional:       General: She is not in acute distress.     Appearance: Normal appearance. She is normal weight. She is ill-appearing.   HENT:      Head: Normocephalic and atraumatic.      Right Ear: Tympanic membrane, ear canal and external ear normal. There is no impacted cerumen.    "   Left Ear: Tympanic membrane, ear canal and external ear normal. There is no impacted cerumen.      Nose: No congestion or rhinorrhea.      Mouth/Throat:      Mouth: Mucous membranes are moist.      Pharynx: Posterior oropharyngeal erythema present. No oropharyngeal exudate.   Eyes:      Extraocular Movements: Extraocular movements intact.      Pupils: Pupils are equal, round, and reactive to light.   Cardiovascular:      Rate and Rhythm: Normal rate and regular rhythm.      Pulses: Normal pulses.      Heart sounds: Normal heart sounds.   Pulmonary:      Effort: Pulmonary effort is normal.      Breath sounds: Normal breath sounds.   Abdominal:      General: Abdomen is flat. Bowel sounds are normal.      Palpations: Abdomen is soft.      Tenderness: There is no abdominal tenderness. There is no right CVA tenderness or left CVA tenderness.   Musculoskeletal:         General: Normal range of motion.      Cervical back: Normal range of motion and neck supple. Tenderness present.   Lymphadenopathy:      Cervical: Cervical adenopathy present.   Skin:     General: Skin is warm and dry.      Capillary Refill: Capillary refill takes less than 2 seconds.   Neurological:      General: No focal deficit present.      Mental Status: She is alert and oriented to person, place, and time. Mental status is at baseline.   Psychiatric:         Mood and Affect: Mood normal.         Behavior: Behavior normal.         Thought Content: Thought content normal.         Judgment: Judgment normal.     Results for orders placed or performed in visit on 10/03/23   POCT CEPHEID GROUP A STREP - PCR   Result Value Ref Range    POC Group A Strep, PCR Not Detected Not Detected, Invalid   POCT CEPHEID COV-2, FLU A/B, RSV - PCR   Result Value Ref Range    SARS-CoV-2 by PCR Negative Negative, Invalid    Influenza virus A RNA Negative Negative, Invalid    Influenza virus B, PCR Negative Negative, Invalid    RSV, PCR Negative Negative, Invalid        Assessment/Plan:   Assessment      1. Pharyngitis, unspecified etiology  POCT CEPHEID GROUP A STREP - PCR    dexamethasone (Decadron) injection (check route below) 10 mg    CULTURE THROAT      2. Flu-like symptoms  POCT CEPHEID COV-2, FLU A/B, RSV - PCR      We discussed supportive measures including humidifier, warm salt water gargles, over-the-counter Cepacol throat lozenges, rest  and increased fluids. Pt was encouraged to seek treatment back in the ER or urgent care for worsening symptoms,  fever greater than 100.5, wheezes or shortness of breath.

## 2023-10-06 LAB
BACTERIA SPEC RESP CULT: NORMAL
SIGNIFICANT IND 70042: NORMAL
SITE SITE: NORMAL
SOURCE SOURCE: NORMAL

## 2024-09-24 ENCOUNTER — OFFICE VISIT (OUTPATIENT)
Dept: URGENT CARE | Facility: PHYSICIAN GROUP | Age: 29
End: 2024-09-24
Payer: COMMERCIAL

## 2024-09-24 VITALS
HEART RATE: 74 BPM | TEMPERATURE: 97.7 F | SYSTOLIC BLOOD PRESSURE: 122 MMHG | DIASTOLIC BLOOD PRESSURE: 74 MMHG | HEIGHT: 67 IN | WEIGHT: 181.3 LBS | BODY MASS INDEX: 28.46 KG/M2 | RESPIRATION RATE: 14 BRPM | OXYGEN SATURATION: 99 %

## 2024-09-24 DIAGNOSIS — J06.9 UPPER RESPIRATORY INFECTION, ACUTE: ICD-10-CM

## 2024-09-24 PROCEDURE — 3078F DIAST BP <80 MM HG: CPT

## 2024-09-24 PROCEDURE — 0241U POCT CEPHEID COV-2, FLU A/B, RSV - PCR: CPT

## 2024-09-24 PROCEDURE — 87651 STREP A DNA AMP PROBE: CPT

## 2024-09-24 PROCEDURE — 99213 OFFICE O/P EST LOW 20 MIN: CPT

## 2024-09-24 PROCEDURE — 3074F SYST BP LT 130 MM HG: CPT

## 2024-09-24 RX ORDER — FLUTICASONE PROPIONATE 50 MCG
1 SPRAY, SUSPENSION (ML) NASAL DAILY
Qty: 16 G | Refills: 0 | Status: SHIPPED | OUTPATIENT
Start: 2024-09-24

## 2024-09-24 RX ORDER — BENZONATATE 100 MG/1
100 CAPSULE ORAL 3 TIMES DAILY PRN
Qty: 30 CAPSULE | Refills: 0 | Status: SHIPPED | OUTPATIENT
Start: 2024-09-24

## 2024-09-24 ASSESSMENT — ENCOUNTER SYMPTOMS
SHORTNESS OF BREATH: 0
CHILLS: 0
SORE THROAT: 1
FEVER: 0
ABDOMINAL PAIN: 0
VOMITING: 0
HEADACHES: 1
COUGH: 1
NAUSEA: 0

## 2024-09-24 NOTE — PROGRESS NOTES
Chief Complaint   Patient presents with    Pharyngitis     Cough, body aches, headache, fatigue x 3 days        HISTORY OF PRESENT ILLNESS: Patient is a pleasant 29 y.o. female who presents to urgent care today cold-like symptoms for the last 3 days with nasal congestion, cough and a sore throat.  Patient has been taking Tylenol with little to no relief.    There are no problems to display for this patient.      Allergies:Codeine    Current Outpatient Medications Ordered in Epic   Medication Sig Dispense Refill    fluticasone (FLONASE) 50 MCG/ACT nasal spray Administer 1 Spray into affected nostril(S) every day. 16 g 0    benzonatate (TESSALON) 100 MG Cap Take 1 Capsule by mouth 3 times a day as needed for Cough. 30 Capsule 0    sertraline (ZOLOFT) 100 MG Tab TAKE 1/2 TABLET BY MOUTH ONCE DAILY THEN 1 ONCE DAILY FOR DEPRESSION AND FOR ANXIETY IF REGRESSING PREFERABLE AT BEDTIME      LO LOESTRIN FE 1 MG-10 MCG / 10 MCG Tab Take 1 Tablet by mouth every day.      vitamin D3 (CHOLECALCIFEROL) 1000 Unit (25 mcg) Tab Take 2,000 Units by mouth every day.       No current [facility]-ordered facility-administered medications on file.       History reviewed. No pertinent past medical history.    Social History     Tobacco Use    Smoking status: Never    Smokeless tobacco: Never   Vaping Use    Vaping status: Never Used   Substance Use Topics    Alcohol use: Never    Drug use: Never       No family status information on file.   History reviewed. No pertinent family history.    Review of Systems   Constitutional:  Positive for malaise/fatigue. Negative for chills and fever.   HENT:  Positive for congestion and sore throat. Negative for ear pain.    Respiratory:  Positive for cough. Negative for shortness of breath.    Gastrointestinal:  Negative for abdominal pain, nausea and vomiting.   Skin:  Negative for rash.   Neurological:  Positive for headaches.       Exam:  /74   Pulse 74   Temp 36.5 °C (97.7 °F) (Temporal)   Resp  "14   Ht 1.702 m (5' 7\")   Wt 82.2 kg (181 lb 4.8 oz)   SpO2 99%   Physical Exam  Vitals reviewed.   Constitutional:       Appearance: Normal appearance. She is normal weight. She is not toxic-appearing.   HENT:      Head: Normocephalic.      Right Ear: Tympanic membrane, ear canal and external ear normal. There is no impacted cerumen.      Left Ear: Tympanic membrane, ear canal and external ear normal. There is no impacted cerumen.      Nose: Congestion present. No nasal tenderness, mucosal edema or rhinorrhea.      Mouth/Throat:      Mouth: Mucous membranes are moist.      Pharynx: Posterior oropharyngeal erythema present. No oropharyngeal exudate.      Tonsils: No tonsillar exudate. 1+ on the right. 1+ on the left.   Eyes:      General:         Right eye: No discharge.         Left eye: No discharge.      Extraocular Movements: Extraocular movements intact.      Conjunctiva/sclera: Conjunctivae normal.      Pupils: Pupils are equal, round, and reactive to light.   Cardiovascular:      Rate and Rhythm: Normal rate and regular rhythm.      Pulses: Normal pulses.      Heart sounds: Normal heart sounds. No murmur heard.  Pulmonary:      Effort: Pulmonary effort is normal. No respiratory distress.      Breath sounds: Normal breath sounds. No stridor.   Musculoskeletal:         General: No swelling, tenderness, deformity or signs of injury.      Cervical back: Normal range of motion and neck supple. No tenderness.   Skin:     General: Skin is warm and dry.      Capillary Refill: Capillary refill takes less than 2 seconds.      Findings: No bruising, erythema, lesion or rash.   Neurological:      General: No focal deficit present.      Mental Status: She is alert.   Psychiatric:         Mood and Affect: Mood normal.         Behavior: Behavior normal.         Thought Content: Thought content normal.         Judgment: Judgment normal.         Assessment/Plan:  1. Upper respiratory infection, acute  - POCT GROUP A STREP, " PCR  - POCT CoV-2, Flu A/B, RSV by PCR  - fluticasone (FLONASE) 50 MCG/ACT nasal spray; Administer 1 Spray into affected nostril(S) every day.  Dispense: 16 g; Refill: 0  - benzonatate (TESSALON) 100 MG Cap; Take 1 Capsule by mouth 3 times a day as needed for Cough.  Dispense: 30 Capsule; Refill: 0    Based on patient's physical presentation along with review of systems I do think they likely have a viral illness.  Regardless vitals are within normal limits, lung sounds are clear to auscultation.  Tested for flu and COVID as well as strep.  Advised patient to drink plenty of fluids, take Motrin and Tylenol as needed, vitamin C, D.  Patient is aware of the plan of care and agreeable at this time, encouraged them to follow-up if they continue to get worse or do not improve.    Patient is negative for anything significant at this time, I did send medications in the form of Flonase and Tessalon Perles to the pharmacy.  Patient notified via [employer].    Supportive care, differential diagnoses, and indications for immediate follow-up discussed with patient.   Pathogenesis of diagnosis discussed including typical length and natural progression.   Instructed to return to clinic or nearest emergency department for any change in condition, further concerns, or worsening of symptoms.  Patient states understanding of the plan of care and discharge instructions.  Instructed to make an appointment, for follow up, with primary care provider.    Please note that this dictation was created using voice recognition software. I have made every reasonable attempt to correct obvious errors, but I expect that there are errors of grammar and possibly content that I did not discover before finalizing the note.      Sandra ALMENDAREZ